# Patient Record
Sex: MALE | Race: BLACK OR AFRICAN AMERICAN | NOT HISPANIC OR LATINO | Employment: UNEMPLOYED | URBAN - METROPOLITAN AREA
[De-identification: names, ages, dates, MRNs, and addresses within clinical notes are randomized per-mention and may not be internally consistent; named-entity substitution may affect disease eponyms.]

---

## 2023-06-15 LAB
ABO GROUP (TYPE) IN BLOOD: NORMAL
ABO GROUP (TYPE) IN BLOOD: NORMAL
ANTIBODY SCREEN: NORMAL
CREATINE KINASE (U/L) IN SER/PLAS: 559 U/L (ref 0–325)
ERYTHROCYTE DISTRIBUTION WIDTH (RATIO) BY AUTOMATED COUNT: 12.5 % (ref 11.5–14.5)
ERYTHROCYTE MEAN CORPUSCULAR HEMOGLOBIN CONCENTRATION (G/DL) BY AUTOMATED: 29.9 G/DL (ref 32–36)
ERYTHROCYTE MEAN CORPUSCULAR VOLUME (FL) BY AUTOMATED COUNT: 92 FL (ref 80–100)
ERYTHROCYTES (10*6/UL) IN BLOOD BY AUTOMATED COUNT: 5.88 X10E12/L (ref 4.5–5.9)
ETHANOL (MG/DL) IN SER/PLAS: <10 MG/DL
FIBRINOGEN (MG/DL) IN PPP BY COAGULATION ASSAY: 244 MG/DL (ref 200–400)
HEMATOCRIT (%) IN BLOOD BY AUTOMATED COUNT: 53.9 % (ref 41–52)
HEMOGLOBIN (G/DL) IN BLOOD: 16.1 G/DL (ref 13.5–17.5)
INR IN PPP BY COAGULATION ASSAY: 1 (ref 0.9–1.1)
LACTATE (MMOL/L) IN SER/PLAS: 1.7 MMOL/L (ref 0.4–2)
LEUKOCYTES (10*3/UL) IN BLOOD BY AUTOMATED COUNT: 9.8 X10E9/L (ref 4.4–11.3)
MAGNESIUM (MG/DL) IN SER/PLAS: 2.54 MG/DL (ref 1.6–2.4)
NRBC (PER 100 WBCS) BY AUTOMATED COUNT: 0 /100 WBC (ref 0–0)
PATIENT TEMPERATURE: 37 DEGREES C
PHOSPHATE (MG/DL) IN SER/PLAS: 6.5 MG/DL (ref 2.5–4.9)
PLATELETS (10*3/UL) IN BLOOD AUTOMATED COUNT: 297 X10E9/L (ref 150–450)
POCT ANION GAP, VENOUS: 16 MMOL/L (ref 10–25)
POCT ANION GAP, VENOUS: 31 MMOL/L (ref 10–25)
POCT ANION GAP, VENOUS: 33 MMOL/L (ref 10–25)
POCT BASE EXCESS, VENOUS: -21.5 MMOL/L (ref -2–3)
POCT BASE EXCESS, VENOUS: -25.2 MMOL/L (ref -2–3)
POCT BASE EXCESS, VENOUS: -5.1 MMOL/L (ref -2–3)
POCT CHLORIDE, VENOUS: 100 MMOL/L (ref 98–107)
POCT CHLORIDE, VENOUS: 100 MMOL/L (ref 98–107)
POCT CHLORIDE, VENOUS: 102 MMOL/L (ref 98–107)
POCT GLUCOSE, VENOUS: 115 MG/DL (ref 74–99)
POCT GLUCOSE, VENOUS: 136 MG/DL (ref 74–99)
POCT GLUCOSE, VENOUS: 143 MG/DL (ref 74–99)
POCT HCO3, VENOUS: 21.2 MMOL/L (ref 22–26)
POCT HCO3, VENOUS: 8 MMOL/L (ref 22–26)
POCT HCO3, VENOUS: 8.8 MMOL/L (ref 22–26)
POCT HEMATOCRIT, VENOUS: 45 % (ref 41–52)
POCT HEMATOCRIT, VENOUS: 49 % (ref 41–52)
POCT HEMATOCRIT, VENOUS: ABNORMAL % (ref 41–52)
POCT HEMOGLOBIN, VENOUS: 14.9 G/DL (ref 13.5–17.5)
POCT HEMOGLOBIN, VENOUS: 16.3 G/DL (ref 13.5–17.5)
POCT HEMOGLOBIN, VENOUS: ABNORMAL G/DL (ref 13.5–17.5)
POCT IONIZED CALCIUM, VENOUS: 1.2 MMOL/L (ref 1.1–1.33)
POCT IONIZED CALCIUM, VENOUS: 1.22 MMOL/L (ref 1.1–1.33)
POCT IONIZED CALCIUM, VENOUS: 1.25 MMOL/L (ref 1.1–1.33)
POCT LACTATE, VENOUS: 6.2 MMOL/L (ref 0.4–2)
POCT LACTATE, VENOUS: >17 MMOL/L (ref 0.4–2)
POCT LACTATE, VENOUS: >17 MMOL/L (ref 0.4–2)
POCT OXY HEMOGLOBIN, VENOUS: 61.9 % (ref 45–75)
POCT OXY HEMOGLOBIN, VENOUS: 84.6 % (ref 45–75)
POCT OXY HEMOGLOBIN, VENOUS: ABNORMAL % (ref 45–75)
POCT PCO2, VENOUS: 35 MMHG (ref 41–51)
POCT PCO2, VENOUS: 42 MMHG (ref 41–51)
POCT PCO2, VENOUS: 43 MMHG (ref 41–51)
POCT PH, VENOUS: 6.89 (ref 7.33–7.43)
POCT PH, VENOUS: 7.01 (ref 7.33–7.43)
POCT PH, VENOUS: 7.3 (ref 7.33–7.43)
POCT PO2, VENOUS: 43 MMHG (ref 35–45)
POCT PO2, VENOUS: 79 MMHG (ref 35–45)
POCT PO2, VENOUS: 91 MMHG (ref 35–45)
POCT POTASSIUM, VENOUS: 3.9 MMOL/L (ref 3.5–5.3)
POCT POTASSIUM, VENOUS: 5 MMOL/L (ref 3.5–5.3)
POCT POTASSIUM, VENOUS: 6.1 MMOL/L (ref 3.5–5.3)
POCT SO2, VENOUS: 66 % (ref 45–75)
POCT SO2, VENOUS: 91 % (ref 45–75)
POCT SO2, VENOUS: ABNORMAL % (ref 45–75)
POCT SODIUM, VENOUS: 133 MMOL/L (ref 136–145)
POCT SODIUM, VENOUS: 135 MMOL/L (ref 136–145)
POCT SODIUM, VENOUS: 137 MMOL/L (ref 136–145)
PROTHROMBIN TIME (PT) IN PPP BY COAGULATION ASSAY: 11.5 SEC (ref 9.8–13.4)
RH FACTOR: NORMAL
RH FACTOR: NORMAL
SARS-COV-2 RESULT: NOT DETECTED
TROPONIN I, HIGH SENSITIVITY: 6 NG/L (ref 0–53)

## 2023-06-16 ENCOUNTER — HOSPITAL ENCOUNTER (INPATIENT)
Dept: DATA CONVERSION | Facility: HOSPITAL | Age: 18
Discharge: HOME | End: 2023-06-21
Attending: SURGERY | Admitting: SURGERY
Payer: COMMERCIAL

## 2023-06-16 DIAGNOSIS — E87.20 ACIDOSIS, UNSPECIFIED: ICD-10-CM

## 2023-06-16 DIAGNOSIS — T22.30XA: ICD-10-CM

## 2023-06-16 DIAGNOSIS — S27.329A CONTUSION OF LUNG, UNSPECIFIED, INITIAL ENCOUNTER: ICD-10-CM

## 2023-06-16 DIAGNOSIS — T07.XXXA UNSPECIFIED MULTIPLE INJURIES, INITIAL ENCOUNTER: ICD-10-CM

## 2023-06-16 DIAGNOSIS — G89.11 ACUTE PAIN DUE TO TRAUMA: ICD-10-CM

## 2023-06-16 DIAGNOSIS — S27.321A CONTUSION OF LUNG, UNILATERAL, INITIAL ENCOUNTER: ICD-10-CM

## 2023-06-16 DIAGNOSIS — Z04.3 ENCOUNTER FOR EXAMINATION AND OBSERVATION FOLLOWING OTHER ACCIDENT: ICD-10-CM

## 2023-06-16 DIAGNOSIS — F41.9 ANXIETY DISORDER, UNSPECIFIED: ICD-10-CM

## 2023-06-16 DIAGNOSIS — Z20.822 CONTACT WITH AND (SUSPECTED) EXPOSURE TO COVID-19: ICD-10-CM

## 2023-06-16 DIAGNOSIS — S51.002A UNSPECIFIED OPEN WOUND OF LEFT ELBOW, INITIAL ENCOUNTER: ICD-10-CM
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
## 2023-06-16 DIAGNOSIS — S27.0XXA TRAUMATIC PNEUMOTHORAX, INITIAL ENCOUNTER: ICD-10-CM

## 2023-06-16 LAB
ANION GAP IN SER/PLAS: 35 MMOL/L (ref 10–20)
ANION GAP IN SER/PLAS: NORMAL
ATRIAL RATE: 63 BPM
CALCIUM (MG/DL) IN SER/PLAS: 10 MG/DL (ref 8.6–10.6)
CALCIUM (MG/DL) IN SER/PLAS: NORMAL
CARBON DIOXIDE, TOTAL (MMOL/L) IN SER/PLAS: 8 MMOL/L (ref 21–32)
CARBON DIOXIDE, TOTAL (MMOL/L) IN SER/PLAS: NORMAL
CHLORIDE (MMOL/L) IN SER/PLAS: 102 MMOL/L (ref 98–107)
CHLORIDE (MMOL/L) IN SER/PLAS: NORMAL
CREATINE KINASE (U/L) IN SER/PLAS: NORMAL
CREATININE (MG/DL) IN SER/PLAS: 1.19 MG/DL (ref 0.5–1.3)
CREATININE (MG/DL) IN SER/PLAS: NORMAL
GFR FEMALE: NORMAL
GFR MALE: 90 ML/MIN/1.73M2
GFR MALE: NORMAL
GLUCOSE (MG/DL) IN SER/PLAS: 140 MG/DL (ref 74–99)
GLUCOSE (MG/DL) IN SER/PLAS: NORMAL
P AXIS: 65 DEGREES
P OFFSET: 188 MS
P ONSET: 134 MS
POTASSIUM (MMOL/L) IN SER/PLAS: 4.1 MMOL/L (ref 3.5–5.3)
POTASSIUM (MMOL/L) IN SER/PLAS: NORMAL
PR INTERVAL: 170 MS
Q ONSET: 219 MS
QRS COUNT: 10 BEATS
QRS DURATION: 96 MS
QT INTERVAL: 386 MS
QTC CALCULATION(BAZETT): 395 MS
QTC FREDERICIA: 392 MS
R AXIS: 66 DEGREES
SODIUM (MMOL/L) IN SER/PLAS: 141 MMOL/L (ref 136–145)
SODIUM (MMOL/L) IN SER/PLAS: NORMAL
T AXIS: 57 DEGREES
T OFFSET: 412 MS
UREA NITROGEN (MG/DL) IN SER/PLAS: 12 MG/DL (ref 6–23)
UREA NITROGEN (MG/DL) IN SER/PLAS: NORMAL
VENTRICULAR RATE: 63 BPM

## 2023-06-17 LAB
ANION GAP IN SER/PLAS: 14 MMOL/L (ref 10–20)
CALCIUM (MG/DL) IN SER/PLAS: 8.7 MG/DL (ref 8.6–10.6)
CARBON DIOXIDE, TOTAL (MMOL/L) IN SER/PLAS: 23 MMOL/L (ref 21–32)
CHLORIDE (MMOL/L) IN SER/PLAS: 106 MMOL/L (ref 98–107)
CREATININE (MG/DL) IN SER/PLAS: 0.81 MG/DL (ref 0.5–1.3)
ERYTHROCYTE DISTRIBUTION WIDTH (RATIO) BY AUTOMATED COUNT: 12.9 % (ref 11.5–14.5)
ERYTHROCYTE MEAN CORPUSCULAR HEMOGLOBIN CONCENTRATION (G/DL) BY AUTOMATED: 31.7 G/DL (ref 32–36)
ERYTHROCYTE MEAN CORPUSCULAR VOLUME (FL) BY AUTOMATED COUNT: 84 FL (ref 80–100)
ERYTHROCYTES (10*6/UL) IN BLOOD BY AUTOMATED COUNT: 4.95 X10E12/L (ref 4.5–5.9)
GFR MALE: >90 ML/MIN/1.73M2
GLUCOSE (MG/DL) IN SER/PLAS: 70 MG/DL (ref 74–99)
HEMATOCRIT (%) IN BLOOD BY AUTOMATED COUNT: 41.6 % (ref 41–52)
HEMOGLOBIN (G/DL) IN BLOOD: 13.2 G/DL (ref 13.5–17.5)
LEUKOCYTES (10*3/UL) IN BLOOD BY AUTOMATED COUNT: 7.5 X10E9/L (ref 4.4–11.3)
MAGNESIUM (MG/DL) IN SER/PLAS: 1.88 MG/DL (ref 1.6–2.4)
NRBC (PER 100 WBCS) BY AUTOMATED COUNT: 0 /100 WBC (ref 0–0)
PHOSPHATE (MG/DL) IN SER/PLAS: 3.1 MG/DL (ref 2.5–4.9)
PLATELETS (10*3/UL) IN BLOOD AUTOMATED COUNT: 233 X10E9/L (ref 150–450)
POTASSIUM (MMOL/L) IN SER/PLAS: 4 MMOL/L (ref 3.5–5.3)
SODIUM (MMOL/L) IN SER/PLAS: 139 MMOL/L (ref 136–145)
UREA NITROGEN (MG/DL) IN SER/PLAS: 12 MG/DL (ref 6–23)

## 2023-06-18 LAB
ABO GROUP (TYPE) IN BLOOD: NORMAL
ALBUMIN (G/DL) IN SER/PLAS: 3.5 G/DL (ref 3.4–5)
ANION GAP IN SER/PLAS: 14 MMOL/L (ref 10–20)
ANTIBODY SCREEN: NORMAL
CALCIUM (MG/DL) IN SER/PLAS: 8.7 MG/DL (ref 8.6–10.6)
CARBON DIOXIDE, TOTAL (MMOL/L) IN SER/PLAS: 23 MMOL/L (ref 21–32)
CHLORIDE (MMOL/L) IN SER/PLAS: 106 MMOL/L (ref 98–107)
CREATININE (MG/DL) IN SER/PLAS: 0.72 MG/DL (ref 0.5–1.3)
ERYTHROCYTE DISTRIBUTION WIDTH (RATIO) BY AUTOMATED COUNT: 12.7 % (ref 11.5–14.5)
ERYTHROCYTE MEAN CORPUSCULAR HEMOGLOBIN CONCENTRATION (G/DL) BY AUTOMATED: 32.9 G/DL (ref 32–36)
ERYTHROCYTE MEAN CORPUSCULAR VOLUME (FL) BY AUTOMATED COUNT: 84 FL (ref 80–100)
ERYTHROCYTES (10*6/UL) IN BLOOD BY AUTOMATED COUNT: 5.15 X10E12/L (ref 4.5–5.9)
GFR MALE: >90 ML/MIN/1.73M2
GLUCOSE (MG/DL) IN SER/PLAS: 87 MG/DL (ref 74–99)
HEMATOCRIT (%) IN BLOOD BY AUTOMATED COUNT: 43.1 % (ref 41–52)
HEMOGLOBIN (G/DL) IN BLOOD: 14.2 G/DL (ref 13.5–17.5)
LEUKOCYTES (10*3/UL) IN BLOOD BY AUTOMATED COUNT: 7.2 X10E9/L (ref 4.4–11.3)
MAGNESIUM (MG/DL) IN SER/PLAS: 1.93 MG/DL (ref 1.6–2.4)
NRBC (PER 100 WBCS) BY AUTOMATED COUNT: 0 /100 WBC (ref 0–0)
PHOSPHATE (MG/DL) IN SER/PLAS: 3 MG/DL (ref 2.5–4.9)
PLATELETS (10*3/UL) IN BLOOD AUTOMATED COUNT: 227 X10E9/L (ref 150–450)
POTASSIUM (MMOL/L) IN SER/PLAS: 4.4 MMOL/L (ref 3.5–5.3)
RH FACTOR: NORMAL
SODIUM (MMOL/L) IN SER/PLAS: 139 MMOL/L (ref 136–145)
UREA NITROGEN (MG/DL) IN SER/PLAS: 12 MG/DL (ref 6–23)

## 2023-06-19 LAB
ACTIVATED PARTIAL THROMBOPLASTIN TIME IN PPP BY COAGULATION ASSAY: 37 SEC (ref 26–39)
INR IN PPP BY COAGULATION ASSAY: 1.1 (ref 0.9–1.1)
PROTHROMBIN TIME (PT) IN PPP BY COAGULATION ASSAY: 12.9 SEC (ref 9.8–13.4)

## 2023-06-26 ENCOUNTER — HOSPITAL ENCOUNTER (EMERGENCY)
Dept: DATA CONVERSION | Facility: HOSPITAL | Age: 18
Discharge: STILL ADMITTED OR ADMINISTRATIVE | End: 2023-06-26
Attending: EMERGENCY MEDICINE
Payer: COMMERCIAL

## 2023-06-26 DIAGNOSIS — Z53.21 PROCEDURE AND TREATMENT NOT CARRIED OUT DUE TO PATIENT LEAVING PRIOR TO BEING SEEN BY HEALTH CARE PROVIDER: ICD-10-CM

## 2023-06-27 ENCOUNTER — HOSPITAL ENCOUNTER (OUTPATIENT)
Dept: DATA CONVERSION | Facility: HOSPITAL | Age: 18
End: 2023-06-27
Payer: COMMERCIAL

## 2023-06-27 DIAGNOSIS — L08.9 LOCAL INFECTION OF THE SKIN AND SUBCUTANEOUS TISSUE, UNSPECIFIED: ICD-10-CM

## 2023-06-27 DIAGNOSIS — Z53.9 PROCEDURE AND TREATMENT NOT CARRIED OUT, UNSPECIFIED REASON: ICD-10-CM

## 2023-07-28 ENCOUNTER — HOSPITAL ENCOUNTER (OUTPATIENT)
Dept: DATA CONVERSION | Facility: HOSPITAL | Age: 18
End: 2023-07-28
Payer: COMMERCIAL

## 2023-07-28 DIAGNOSIS — S41.102A UNSPECIFIED OPEN WOUND OF LEFT UPPER ARM, INITIAL ENCOUNTER: ICD-10-CM

## 2023-07-28 DIAGNOSIS — S41.102S: ICD-10-CM
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
## 2023-09-30 NOTE — H&P
History & Physical Reviewed:   I have reviewed the History and Physical dated:  10-Jul-2023   History and Physical reviewed and relevant findings noted. Patient examined to review pertinent physical  findings.: No significant changes   Home Medications Reviewed: no changes noted   Allergies Reviewed: no changes noted       ERAS (Enhanced Recovery After Surgery):  ·  ERAS Patient: no     Consent:   COVID-19 Consent:  ·  COVID-19 Risk Consent Surgeon has reviewed key risks related to the risk of polo COVID-19 and if they contract COVID-19 what the risks are.       Electronic Signatures:  Mlavin Starr (APRN-CNP)  (Signed 28-Jul-2023 06:00)   Authored: History & Physical Reviewed, ERAS, Consent,  Note Completion      Last Updated: 28-Jul-2023 06:00 by Malvin Starr (APRN-CNP)

## 2023-09-30 NOTE — H&P
History & Physical Reviewed:   I have reviewed the History and Physical dated:  19-Jul-2023   History and Physical reviewed and relevant findings noted. Patient examined to review pertinent physical  findings.: No significant changes   Home Medications Reviewed: no changes noted   Allergies Reviewed: no changes noted       ERAS (Enhanced Recovery After Surgery):  ·  ERAS Patient: no     Consent:   COVID-19 Consent:  ·  COVID-19 Risk Consent Surgeon has reviewed key risks related to the risk of polo COVID-19 and if they contract COVID-19 what the risks are.       Electronic Signatures:  Conner Hoang)   (Signed 21-Jul-2023 10:27)   Co-Signer: History & Physical Reviewed, Note Completion, ERAS, Consent  Florencia Enciso (PA (PHYSICIAN))   (Signed 19-Jul-2023 06:56)   Entered: History & Physical Reviewed, Note Completion, ERAS, Consent   Authored: History & Physical Reviewed, ERAS, Consent, Note Completion    Last Updated: 21-Jul-2023 10:27 by Conner Hoang)
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
